# Patient Record
Sex: MALE | Race: WHITE | NOT HISPANIC OR LATINO | Employment: UNEMPLOYED | URBAN - METROPOLITAN AREA
[De-identification: names, ages, dates, MRNs, and addresses within clinical notes are randomized per-mention and may not be internally consistent; named-entity substitution may affect disease eponyms.]

---

## 2020-04-14 PROBLEM — G93.89 BRAIN MASS: Status: ACTIVE | Noted: 2020-04-14

## 2020-05-08 PROBLEM — D49.6 BRAIN TUMOR: Status: ACTIVE | Noted: 2020-05-08

## 2020-05-10 PROBLEM — D49.6 BRAIN TUMOR: Status: RESOLVED | Noted: 2020-05-08 | Resolved: 2020-05-10

## 2020-05-20 PROBLEM — Z98.890 STATUS POST CRANIOTOMY: Status: ACTIVE | Noted: 2020-05-20

## 2020-05-22 ENCOUNTER — NURSE TRIAGE (OUTPATIENT)
Dept: ADMINISTRATIVE | Facility: CLINIC | Age: 50
End: 2020-05-22

## 2020-05-22 NOTE — TELEPHONE ENCOUNTER
Attempted to contact x 2 for post procedure symptoms monitoring, no answer.    Reason for Disposition   No answer.  First attempt to contact caller.  Follow-up call scheduled within 15 minutes.    Additional Information   Negative: Caller has already spoken with the PCP (or office), and has no further questions   Negative: Caller has already spoken with another triager and has no further questions   Negative: Caller has already spoken with another triager or PCP (or office), and has further questions and triager able to answer questions.   Negative: Busy signal.  First attempt to contact caller.  Follow-up call scheduled within 15 minutes.    Protocols used: NO CONTACT OR DUPLICATE CONTACT CALL-A-[place]

## 2021-07-01 ENCOUNTER — PATIENT MESSAGE (OUTPATIENT)
Dept: ADMINISTRATIVE | Facility: OTHER | Age: 51
End: 2021-07-01

## 2023-03-14 PROBLEM — G44.59 OTHER COMPLICATED HEADACHE SYNDROME: Status: ACTIVE | Noted: 2023-03-14

## 2023-04-21 PROBLEM — H93.12 TINNITUS OF LEFT EAR: Status: ACTIVE | Noted: 2023-04-21

## 2023-09-17 PROBLEM — H93.13 TINNITUS OF BOTH EARS: Status: ACTIVE | Noted: 2023-04-21

## 2023-09-17 PROBLEM — R06.83 SNORING: Status: ACTIVE | Noted: 2023-09-17

## 2023-09-17 PROBLEM — G47.30 SLEEP-DISORDERED BREATHING: Status: ACTIVE | Noted: 2023-09-17

## 2023-10-06 PROBLEM — G50.9 TRIGEMINAL NEUROPATHY: Status: ACTIVE | Noted: 2023-10-06
